# Patient Record
Sex: FEMALE | Race: OTHER | NOT HISPANIC OR LATINO | URBAN - METROPOLITAN AREA
[De-identification: names, ages, dates, MRNs, and addresses within clinical notes are randomized per-mention and may not be internally consistent; named-entity substitution may affect disease eponyms.]

---

## 2022-11-18 ENCOUNTER — EMERGENCY (EMERGENCY)
Facility: HOSPITAL | Age: 37
LOS: 1 days | Discharge: HOME | End: 2022-11-18
Attending: STUDENT IN AN ORGANIZED HEALTH CARE EDUCATION/TRAINING PROGRAM | Admitting: STUDENT IN AN ORGANIZED HEALTH CARE EDUCATION/TRAINING PROGRAM

## 2022-11-18 VITALS
OXYGEN SATURATION: 100 % | RESPIRATION RATE: 18 BRPM | DIASTOLIC BLOOD PRESSURE: 50 MMHG | TEMPERATURE: 97 F | HEART RATE: 71 BPM | SYSTOLIC BLOOD PRESSURE: 121 MMHG

## 2022-11-18 VITALS
OXYGEN SATURATION: 100 % | TEMPERATURE: 98 F | HEART RATE: 97 BPM | SYSTOLIC BLOOD PRESSURE: 100 MMHG | RESPIRATION RATE: 18 BRPM | DIASTOLIC BLOOD PRESSURE: 68 MMHG

## 2022-11-18 LAB
ALBUMIN SERPL ELPH-MCNC: 4.8 G/DL — SIGNIFICANT CHANGE UP (ref 3.3–5)
ALP SERPL-CCNC: 49 U/L — SIGNIFICANT CHANGE UP (ref 40–120)
ALT FLD-CCNC: 25 U/L — SIGNIFICANT CHANGE UP (ref 4–33)
ANION GAP SERPL CALC-SCNC: 13 MMOL/L — SIGNIFICANT CHANGE UP (ref 7–14)
APPEARANCE UR: CLEAR — SIGNIFICANT CHANGE UP
AST SERPL-CCNC: 21 U/L — SIGNIFICANT CHANGE UP (ref 4–32)
BASOPHILS # BLD AUTO: 0.05 K/UL — SIGNIFICANT CHANGE UP (ref 0–0.2)
BASOPHILS NFR BLD AUTO: 0.7 % — SIGNIFICANT CHANGE UP (ref 0–2)
BILIRUB SERPL-MCNC: 1.7 MG/DL — HIGH (ref 0.2–1.2)
BILIRUB UR-MCNC: NEGATIVE — SIGNIFICANT CHANGE UP
BLD GP AB SCN SERPL QL: NEGATIVE — SIGNIFICANT CHANGE UP
BUN SERPL-MCNC: 4 MG/DL — LOW (ref 7–23)
CALCIUM SERPL-MCNC: 9.6 MG/DL — SIGNIFICANT CHANGE UP (ref 8.4–10.5)
CHLORIDE SERPL-SCNC: 102 MMOL/L — SIGNIFICANT CHANGE UP (ref 98–107)
CO2 SERPL-SCNC: 19 MMOL/L — LOW (ref 22–31)
COLOR SPEC: COLORLESS — SIGNIFICANT CHANGE UP
CREAT SERPL-MCNC: 0.43 MG/DL — LOW (ref 0.5–1.3)
DIFF PNL FLD: NEGATIVE — SIGNIFICANT CHANGE UP
EGFR: 130 ML/MIN/1.73M2 — SIGNIFICANT CHANGE UP
EOSINOPHIL # BLD AUTO: 0.14 K/UL — SIGNIFICANT CHANGE UP (ref 0–0.5)
EOSINOPHIL NFR BLD AUTO: 2 % — SIGNIFICANT CHANGE UP (ref 0–6)
GLUCOSE SERPL-MCNC: 99 MG/DL — SIGNIFICANT CHANGE UP (ref 70–99)
GLUCOSE UR QL: NEGATIVE — SIGNIFICANT CHANGE UP
HCG SERPL-ACNC: SIGNIFICANT CHANGE UP MIU/ML
HCT VFR BLD CALC: 33.3 % — LOW (ref 34.5–45)
HGB BLD-MCNC: 11.3 G/DL — LOW (ref 11.5–15.5)
IANC: 4.92 K/UL — SIGNIFICANT CHANGE UP (ref 1.8–7.4)
IMM GRANULOCYTES NFR BLD AUTO: 0.4 % — SIGNIFICANT CHANGE UP (ref 0–0.9)
KETONES UR-MCNC: ABNORMAL
LEUKOCYTE ESTERASE UR-ACNC: NEGATIVE — SIGNIFICANT CHANGE UP
LYMPHOCYTES # BLD AUTO: 1.45 K/UL — SIGNIFICANT CHANGE UP (ref 1–3.3)
LYMPHOCYTES # BLD AUTO: 20.7 % — SIGNIFICANT CHANGE UP (ref 13–44)
MCHC RBC-ENTMCNC: 27.9 PG — SIGNIFICANT CHANGE UP (ref 27–34)
MCHC RBC-ENTMCNC: 33.9 GM/DL — SIGNIFICANT CHANGE UP (ref 32–36)
MCV RBC AUTO: 82.2 FL — SIGNIFICANT CHANGE UP (ref 80–100)
MONOCYTES # BLD AUTO: 0.42 K/UL — SIGNIFICANT CHANGE UP (ref 0–0.9)
MONOCYTES NFR BLD AUTO: 6 % — SIGNIFICANT CHANGE UP (ref 2–14)
NEUTROPHILS # BLD AUTO: 4.92 K/UL — SIGNIFICANT CHANGE UP (ref 1.8–7.4)
NEUTROPHILS NFR BLD AUTO: 70.2 % — SIGNIFICANT CHANGE UP (ref 43–77)
NITRITE UR-MCNC: NEGATIVE — SIGNIFICANT CHANGE UP
NRBC # BLD: 0 /100 WBCS — SIGNIFICANT CHANGE UP (ref 0–0)
NRBC # FLD: 0 K/UL — SIGNIFICANT CHANGE UP (ref 0–0)
PH UR: 6.5 — SIGNIFICANT CHANGE UP (ref 5–8)
PLATELET # BLD AUTO: 243 K/UL — SIGNIFICANT CHANGE UP (ref 150–400)
POTASSIUM SERPL-MCNC: 3.7 MMOL/L — SIGNIFICANT CHANGE UP (ref 3.5–5.3)
POTASSIUM SERPL-SCNC: 3.7 MMOL/L — SIGNIFICANT CHANGE UP (ref 3.5–5.3)
PROT SERPL-MCNC: 7.6 G/DL — SIGNIFICANT CHANGE UP (ref 6–8.3)
PROT UR-MCNC: NEGATIVE — SIGNIFICANT CHANGE UP
RBC # BLD: 4.05 M/UL — SIGNIFICANT CHANGE UP (ref 3.8–5.2)
RBC # FLD: 13.9 % — SIGNIFICANT CHANGE UP (ref 10.3–14.5)
RH IG SCN BLD-IMP: POSITIVE — SIGNIFICANT CHANGE UP
SODIUM SERPL-SCNC: 134 MMOL/L — LOW (ref 135–145)
SP GR SPEC: 1 — LOW (ref 1.01–1.05)
UROBILINOGEN FLD QL: SIGNIFICANT CHANGE UP
WBC # BLD: 7.01 K/UL — SIGNIFICANT CHANGE UP (ref 3.8–10.5)
WBC # FLD AUTO: 7.01 K/UL — SIGNIFICANT CHANGE UP (ref 3.8–10.5)

## 2022-11-18 PROCEDURE — 99283 EMERGENCY DEPT VISIT LOW MDM: CPT

## 2022-11-18 PROCEDURE — 99285 EMERGENCY DEPT VISIT HI MDM: CPT

## 2022-11-18 PROCEDURE — 76801 OB US < 14 WKS SINGLE FETUS: CPT | Mod: 26

## 2022-11-18 PROCEDURE — 76817 TRANSVAGINAL US OBSTETRIC: CPT | Mod: 26

## 2022-11-18 RX ORDER — SODIUM CHLORIDE 9 MG/ML
1000 INJECTION INTRAMUSCULAR; INTRAVENOUS; SUBCUTANEOUS ONCE
Refills: 0 | Status: COMPLETED | OUTPATIENT
Start: 2022-11-18 | End: 2022-11-18

## 2022-11-18 RX ORDER — ACETAMINOPHEN 500 MG
1000 TABLET ORAL ONCE
Refills: 0 | Status: COMPLETED | OUTPATIENT
Start: 2022-11-18 | End: 2022-11-18

## 2022-11-18 RX ADMIN — Medication 400 MILLIGRAM(S): at 15:14

## 2022-11-18 RX ADMIN — SODIUM CHLORIDE 1000 MILLILITER(S): 9 INJECTION INTRAMUSCULAR; INTRAVENOUS; SUBCUTANEOUS at 12:34

## 2022-11-18 NOTE — ED PROVIDER NOTE - OBJECTIVE STATEMENT
1 Patient explained patient is a 37-year-old  presenting from OB/GYN office after a lack of fetal heart detected on first ultrasound of the pregnancy yesterday. The patient is 7 weeks pregnant.  Follows with Dr. ASIF mansfield/braeden on [facility].  Patient has no medical problems.  No family history of early abortions terminations, no family history of metabolic or endocrine disease. Prior pregnancy was normal without issue, baby born at normal term through .  No recent fevers, no malaise, no fatigue.  Patient with good p.o. intake and outout.  Patient without vaginal bleeding or passing clots or tissue.  Patient takes no prenatal vitamins.  Regarding chief complaint, patient with abdominal cramping 4 days.  Feels similar to when she has periods.  Takes no medicine for the pain. Has no triggers.    Patient presents as a second opinion regarding a dilation and evacuation of possible fetal demise given no heartbeat.     called dr robin mansfield. seen yesterday. 7 weeks w/o heart beat. needed d/c. Choices in [facility]. was anext day. Wanted second opinion b/fore deciding on D/C.     In this young pregnant first trimester pregnancy, worry about  and infection.  But no fevers at home, apart from abdominal cramps no other pain.  Not passing clots.  Concern for missed

## 2022-11-18 NOTE — CONSULT NOTE ADULT - ATTENDING COMMENTS
38 y/o P0 at 10+ weeks by LMP presenting with missed  for second opinion.  TVUS confirms IUP with CRL 8mm and no FHR detected, diagnostic of missed .  Patient to follow up with outside OB/GYN  No acute GYN interventions at this time.    Simba Haynes MD  Covering GYN [facility] Attending

## 2022-11-18 NOTE — ED PROVIDER NOTE - PHYSICAL EXAMINATION
CONSTITUTIONAL: well-appearing, in NAD  SKIN: Warm dry  HEAD: NCAT  EYES: no scleral icterus, conjunctiva pink  ENT: normal pharynx with no erythema or exudates  NECK: Supple; non tender. Full ROM.  CARD: RRR, no murmurs.  RESP: clear to ausculation b/l. No crackles or wheezing.  ABD: Abdomen is soft and nontender in all quadrants.  No distention noted.  No rebound noted in lower quadrant. Speculum exam deferred to female provider.  Will provide and progress note results of exam.  MSK: no pedal edema, no calf tenderness  NEURO: normal motor. normal sensory. CN II-XII grossly intact.   PSYCH: Cooperative, appropriate.

## 2022-11-18 NOTE — ED ADULT NURSE NOTE - OBJECTIVE STATEMENT
Pt presents to ED ambulatory with steady gait c/o abdominal pain x6-7 days. Pt states lmp was 9/8 and is 6-7 weeks pregnant. States she had her first check up with her gyn yesterday and was told they could not find a heartbeat. Pt came to ED for second opinion. Denies any other medical complaints.

## 2022-11-18 NOTE — ED ADULT TRIAGE NOTE - CHIEF COMPLAINT QUOTE
pt states she is 6-7 weeks pregnant co intermittent abdominal pain was told they did not see a  heart beat on u/s.

## 2022-11-18 NOTE — ED PROVIDER NOTE - ATTENDING CONTRIBUTION TO CARE
I have personally performed a face to face medical and diagnostic evaluation of the patient. I have discussed with and reviewed the Resident's note and agree with the History, ROS, Physical Exam and MDM unless otherwise indicated. A brief summary of my personal evaluation and impression can be found below.    Tracey TORIBIO: 37-year-old female G2, P1 no medical problems presents with a chief complaint of requesting second opinion in setting of being 7 weeks pregnant recent ultrasound per her OB/GYN showed concern for possible fetal demise and no fetal heart beat.  Prior to recent ultrasound with OB has not had ultrasound for this pregnancy.  Patient reports she is having lower abdominal cramping that is a little more severe than normal menstrual cramps.  No nausea no vomiting no fever.  No vaginal bleeding or discharge.  No medicine for pain.  No chest pain shortness of breath no new swelling in the lower extremities.  No urinary complaints. Pt requesting female for pelvic exam.     All other ROS negative, except as above and as per HPI and ROS section.    VITALS: Initial triage and subsequent vitals have been reviewed by me.  GEN APPEARANCE: WDWN, alert, non-toxic, NAD  HEAD: Atraumatic.  EYES: PERRLa, EOMI, vision grossly intact.   NECK: Supple  CV: RRR, S1S2, no c/r/m/g. Cap refill <2 seconds. No bruits.   LUNGS: CTAB. No abnormal breath sounds.  ABDOMEN: mild diffuse lower abdominal tenderness. No guarding or rebound.   MSK/EXT: No spinal or extremity point tenderness. No CVA ttp. Pelvis stable. No peripheral edema.  NEURO: Alert, follows commands. Weight bearing normal. Speech normal. Sensation and motor normal x4 extremities.   SKIN: Warm, dry and intact. No rash.  PSYCH: Appropriate    Plan/MDM: Tracey TORIBIO: 37-year-old female G2, P1 no medical problems presents with a chief complaint of requesting second opinion in setting of being 7 weeks pregnant recent ultrasound per her OB/GYN showed concern for possible fetal demise and no fetal heart beat.  Prior to recent ultrasound with OB has not had ultrasound for this pregnancy.  Patient reports she is having lower abdominal cramping that is a little more severe than normal menstrual cramps.  No nausea no vomiting no fever.  No vaginal bleeding or discharge.  No medicine for pain. Exam vital signs stable nontoxic-appearing with diffuse lower abdominal tenderness.  DDx concern for admission to  versus other complication of early pregnancy.  Plan to check labs urine type and screen transvaginal ultrasound OB/GYN consult as indicated reassess thereafter.

## 2022-11-18 NOTE — ED PROVIDER NOTE - PATIENT PORTAL LINK FT
You can access the FollowMyHealth Patient Portal offered by [facility] by registering at the following website: [email or web address]. By joining [facility]s FollowMyHealth portal, you will also be able to view your health information using other applications (apps) compatible with our system.

## 2022-11-18 NOTE — ED PROVIDER NOTE - NS ED ROS FT
Constitutional: (-) chills, (-) lethargy  ENMT: (-) nasal discharge  Cardiac: (-) chest pain  Respiratory:  (-) cough  GI:  (-) nausea (-) vomiting (-) diarrhea (+) abdominal pain.  :  (-) dysuria (-) frequency (-) burning.  MS:  (-) back pain   Neuro:  (-) headache (-) numbness (-) tingling (-) focal weakness  Skin:  (-) rash  Except as documented in the HPI,  all other systems are negative

## 2022-11-18 NOTE — ED PROVIDER NOTE - PROGRESS NOTE DETAILS
Patient stable at this time waiting on results clearing with mother.  Discussed that OB/GYN is to see her, she states she wants to stay with [facility] for her OB care.  Ultrasound without yolk sac and without fetal heart tones.  Patient is Rh+. OB/GYN with consultation.  OB/GYN is concerned for a spontaneous .  From their standpoint, patient is safe to discharge with close follow-up.  Will provide OB/GYN clinic information from [facility] for resident clinic.

## 2022-11-18 NOTE — ED PROVIDER NOTE - NSFOLLOWUPINSTRUCTIONS_ED_ALL_ED_FT
- Your testing/exams was/were reassuring that dangerous emergencies/conditions are less likely to be occurring or to have occurred.      - Take all medications as directed.    - For pain or fever you can take ibuprofen (motrin, advil) or acetaminophen (tylenol) as needed, as directed on packaging.  - I have provided information for the resident clinic for OB/GYN, would like appointment as soon as possible.  The number is attached here is the number:  is number [phone].  Please call this number as soon as possible and try to get the most soon appointment.  The residents at the clinic, or doctors at the clinic, know about you.    - Return to the emergency department for any worsening symptoms or concerns, such as heavy vaginal bleeding, fevers.

## 2022-11-18 NOTE — CONSULT NOTE ADULT - SUBJECTIVE AND OBJECTIVE BOX
Gyn Consult Note  HARISH HEADLEY  37y  Female [id number]    HPI: Pt is a 37y  LMP 9/8 LMP 10w1d presenting to ED for second opinion of ultrasound. Pt saw her private OB yesterday, ultrasound findings at that time demonstrated no FH. Pt denies complaints at this time. Pt denies pelvic cramping, vaginal bleeding, fevers, chills, n/v, dizziness. Pt has been tolerating PO.     Name of GYN Physician: Dr. Sabine Bowie    OBHx: 2012- pLT in [facility]  GYNHx: Denies fibroids, cysts, endometriosis, STI's, Abnormal pap smears   PMH: denies   PSH: C/S x1  Meds: Bisacodyl, Famotidine, Vit D  NKDA    Vital Signs Last 24 Hrs  T(C): 36.9 (2022 17:09), Max: 36.9 (2022 17:09)  T(F): 98.5 (2022 17:09), Max: 98.5 (2022 17:09)  HR: 97 (2022 17:09) (71 - 97)  BP: 100/68 (2022 17:09) (100/68 - 121/50)  BP(mean): --  RR: 18 (2022 17:09) (18 - 18)  SpO2: 100% (2022 17:09) (100% - 100%)    Parameters below as of 2022 17:09  Patient On (Oxygen Delivery Method): room air        Physical Exam:   General: sitting comfortably in bed, NAD   Abd: Soft, non-tender, non-distended.   : No bleeding on pad. External labia wnl. Uterus wnl, nontender. Adnexa non palpable b/l. No CMT. Cervix closed.   Speculum Exam: No active bleeding from os.  Physiologic discharge.    Ext: non-tender b/l, no edema     LABS:                              11.3   7.01  )-----------( 243      ( 2022 13:03 )             33.3     11-18    134<L>  |  102  |  4<L>  ----------------------------<  99  3.7   |  19<L>  |  0.43<L>    Ca    9.6      2022 13:03    TPro  7.6  /  Alb  4.8  /  TBili  1.7<H>  /  DBili  x   /  AST  21  /  ALT  25  /  AlkPhos  49  11-18      Urinalysis Basic - ( 2022 13:03 )    Color: Colorless / Appearance: Clear / S.005 / pH: x  Gluc: x / Ketone: Trace  / Bili: Negative / Urobili: <2 mg/dL   Blood: x / Protein: Negative / Nitrite: Negative   Leuk Esterase: Negative / RBC: x / WBC x   Sq Epi: x / Non Sq Epi: x / Bacteria: x        RADIOLOGY & ADDITIONAL STUDIES:  < from: US Transvaginal, OB (22 @ 14:11) >  FINDINGS:  Uterus: Anteverted in orientation. Small subchorionic hematoma.    Gestational Sac Size (mean): N/A  Gestations Sac Shape : Normal.  Crown Rump Length: 8 mm  Estimated Gestational Age: 6 weeks, 6 days by crown rump length.  Yolk Sac: Not visualized.  Fetal Heart Rate: Absent.    Right ovary: Not visualized.  Left ovary: 2.8 cm x 1.6 cm x 1.8 cm. Within normal limits. Normal   arterial and venous waveforms.    Fluid: None.    IMPRESSION: Findings are suspicious for a failed pregnancy.    --- End of Report ---          < end of copied text >  < from: US Transvaginal, OB (22 @ 14:11) >  FINDINGS:  Uterus: Anteverted in orientation. Small subchorionic hematoma.    Gestational Sac Size (mean): N/A  Gestations Sac Shape : Normal.  Crown Rump Length: 8 mm  Estimated Gestational Age: 6 weeks, 6 days by crown rump length.  Yolk Sac: Not visualized.  Fetal Heart Rate: Absent.    Right ovary: Not visualized.  Left ovary: 2.8 cm x 1.6 cm x 1.8 cm. Within normal limits. Normal   arterial and venous waveforms.    Fluid: None.    IMPRESSION: Findings are suspicious for a failed pregnancy.    --- End of Report ---          < end of copied text >    A/P: Pt is a 37y  LMP 9/8 LMP 10w1d with no significant PMH presenting to ED due to concerns for SAB, TVUS today demonstrating CRL 8mm with absent fetal heart rate. Pt asymptomatic, benign physical exam, VSS. Pt's ultrasound today diagnostic for SAB.    -Diagnosis discussed with pt at bedside and questions answered  -No acute GYN intervention at this time  -Pt encouraged to follow-up outpatient with her OB  -Return precautions to ED reviewed with pt    D/w Dr. Dallas Avila PGY1

## 2022-11-18 NOTE — ED PROVIDER NOTE - CLINICAL SUMMARY MEDICAL DECISION MAKING FREE TEXT BOX
In this young pregnant first trimester pregnancy, worry about  and infection.  But no fevers at home, apart from abdominal cramps no other pain.  Not passing clots.  Concern for missed . Or at least inevitable .  Will confirm results  of possible loss of fetal heart be given story.  Will ultrasound and gather basic labs to expedite further therapy.  And make sure that there is no sign of systemic infection and there is no worry of anemia that would be a hindrance or a danger to discharge the patient from.  Likely has care at [facility] in [facility].  We will confirm good follow-up before likely discharging patient to definitive care as an outpatient.  That is to say, to MD clinic in [place] where she was referred to by her primary OB/GYN.    Less concerned about any other intra-abdominal pathology given no similar in nature to her prior periods and no signs of focal abdominal tenderness.  Will check urine for UTI.

## 2022-11-19 LAB
CULTURE RESULTS: SIGNIFICANT CHANGE UP
SPECIMEN SOURCE: SIGNIFICANT CHANGE UP

## 2022-11-22 PROBLEM — Z00.00 ENCOUNTER FOR PREVENTIVE HEALTH EXAMINATION: Status: ACTIVE | Noted: 2022-11-22

## 2022-11-23 ENCOUNTER — OUTPATIENT (OUTPATIENT)
Dept: OUTPATIENT SERVICES | Facility: HOSPITAL | Age: 37
LOS: 1 days | End: 2022-11-23

## 2022-11-23 ENCOUNTER — APPOINTMENT (OUTPATIENT)
Dept: OBGYN | Facility: HOSPITAL | Age: 37
End: 2022-11-23
Payer: MEDICAID

## 2022-11-23 VITALS
HEART RATE: 80 BPM | HEIGHT: 60 IN | SYSTOLIC BLOOD PRESSURE: 134 MMHG | DIASTOLIC BLOOD PRESSURE: 52 MMHG | WEIGHT: 129 LBS | BODY MASS INDEX: 25.32 KG/M2

## 2022-11-23 DIAGNOSIS — O02.1 MISSED ABORTION: ICD-10-CM

## 2022-11-23 PROCEDURE — 99213 OFFICE O/P EST LOW 20 MIN: CPT | Mod: GE

## 2022-11-23 PROCEDURE — 99203 OFFICE O/P NEW LOW 30 MIN: CPT | Mod: GE

## 2022-11-23 RX ORDER — MISOPROSTOL 200 UG/1
200 TABLET ORAL EVERY 4 HOURS
Qty: 8 | Refills: 0 | Status: ACTIVE | COMMUNITY
Start: 2022-11-23 | End: 1900-01-01

## 2022-11-23 RX ORDER — IBUPROFEN 600 MG/1
600 TABLET, FILM COATED ORAL 4 TIMES DAILY
Qty: 30 | Refills: 0 | Status: ACTIVE | COMMUNITY
Start: 2022-11-23 | End: 1900-01-01

## 2022-11-23 RX ORDER — ONDANSETRON 4 MG/1
4 TABLET ORAL EVERY 6 HOURS
Qty: 10 | Refills: 0 | Status: ACTIVE | COMMUNITY
Start: 2022-11-23 | End: 1900-01-01

## 2022-11-23 RX ADMIN — MIFEPRISTONE 0 MG: 200 TABLET ORAL at 00:00

## 2022-11-28 PROBLEM — O02.1 MISSED ABORTION: Status: ACTIVE | Noted: 2022-11-23

## 2022-11-28 NOTE — PHYSICAL EXAM
[Chaperone Present] : A chaperone was present in the examining room during all aspects of the physical examination [Appropriately responsive] : appropriately responsive [Alert] : alert [No Acute Distress] : no acute distress [Soft] : soft [Non-tender] : non-tender [Non-distended] : non-distended [Labia Majora] : normal [Labia Minora] : normal [No Bleeding] : There was no active vaginal bleeding [Normal] : normal [Uterine Adnexae] : non-palpable [Tenderness] : nontender [FreeTextEntry5] : Closed, without bleeding. No erythema.

## 2022-11-28 NOTE — REASON FOR VISIT
[Follow-Up] : a follow-up evaluation of [[employer] ] : provided by [employer]   [Time Spent: ____ minutes] : Total time spent using  services: [unfilled] minutes. The patient's primary language is not English thus required  services. [Interpreters_IDNumber] : [id number] [TWNoteComboBox1] : Eren

## 2022-11-28 NOTE — HISTORY OF PRESENT ILLNESS
[FreeTextEntry1] : Mrs. Gillespie is a 38 y/o  presenting today for followup after MAB was confirmed in the ED via sonogram on . \par \par Pt initally presenting to ED for a second opinion after no FHR was visualized in private OB office. At that time patient denies fevers/ chills/ abdominal pain/ or vaginal bleeding. Cervix was closed and patient was given expectant management and told to follow up outpatient. Today the patient feels well without compliant. Again denies any fevers/ chills/ abdominal pain/ or vaginal bleeding since last evaluation. Pt desires resolution today if possible. \par \par \par Name of GYN Physician: Dr. Sabine Bowie\par OBHx: 2012- pLTCS in [facility]\par GYNHx: Denies fibroids, cysts, endometriosis, STI's, Abnormal pap smears \par PMH: denies \par PSH: C/S x1\par Meds: Bisacodyl, Famotidine, Vit D\par NKDA

## 2022-11-28 NOTE — REVIEW OF SYSTEMS
[Bloating] : bloating [Fever] : no fever [Chills] : no chills [Fatigue] : no fatigue [Dyspnea] : no dyspnea [Chest Pain] : no chest pain [Abdominal Pain] : no abdominal pain [Vomiting] : no vomiting [Nausea] : no nausea [Urgency] : no urgency [Dysuria] : no dysuria [Pelvic pain] : no pelvic pain

## 2022-11-28 NOTE — PLAN
[FreeTextEntry1] : Mrs. Gillespie is a 36 y/o  presenting today for followup after MAB was confirmed in the ED via sonogram on . Desires medication management. Sonogram detailed below. \par \par 1. Medical  \par - All consents signed today, all questions/concerns addressed\par - Patient offered pamphlet for support services. Patient declined. Seen with . Has strong community support. \par \par 2. Scheduling\par - mifepristone consents signed, mifepristone pamphlet given\par - Mifepristone 200 mg administered, lot #:[id number] \par \par 3. Pain control \par - Prescription for Percocet 5/325mg #5 with no refills given\par - Reviewed ibuprofen 600 mg q6 hours\par - Zofran 4mg PO PRN \par \par 4. Labs/Blood type\par - CBC WNL\par - Patient is O+ \par \par 5. Post op\par - Pt to be seen for  2 week in-office follow up\par - Medical  instruction and information packet given, reviewed bleeding and infection precautions.  24 hour contact information reviewed and provided\par - all questions/concerns addressed\par \par DW: Dr. Mcclain \par Veronica Merlos, PGY-2 \par \par \par Estimated Gestational Age by LMP: 10 weeks, 1 day.\par \par COMPARISON: None available.\par \par Endovaginal pelvic sonogram as per order. Transabdominal pelvic sonogram \par was also performed as part of our standard protocol. Color and Spectral \par Doppler was performed.\par \par FINDINGS:\par Uterus: Anteverted in orientation. Small subchorionic hematoma.\par \par Gestational Sac Size (mean): N/A\par Gestations Sac Shape : Normal.\par Crown Rump Length: 8 mm\par Estimated Gestational Age: 6 weeks, 6 days by crown rump length.\par Yolk Sac: Not visualized.\par Fetal Heart Rate: Absent.\par \par Right ovary: Not visualized.\par Left ovary: 2.8 cm x 1.6 cm x 1.8 cm. Within normal limits. Normal \par arterial and venous waveforms.\par \par Fluid: None.\par \par IMPRESSION: Findings are suspicious for a failed pregnancy.\par \par --- End of Report ---\par \par

## 2022-11-29 DIAGNOSIS — O02.1 MISSED ABORTION: ICD-10-CM

## 2022-12-08 ENCOUNTER — APPOINTMENT (OUTPATIENT)
Dept: OBGYN | Facility: HOSPITAL | Age: 37
End: 2022-12-08

## 2023-01-09 ENCOUNTER — APPOINTMENT (OUTPATIENT)
Dept: OBGYN | Facility: HOSPITAL | Age: 38
End: 2023-01-09